# Patient Record
(demographics unavailable — no encounter records)

---

## 2025-01-21 NOTE — ASSESSMENT
[FreeTextEntry1] : Patient is a 40yo female who presents to the office for CPE.  Health Maintenance - Due for initial CRC screening given close family history of colon cancer, discussed importance of early screening, pt agreeable.  Referral placed to see GI. - Due for skin check, Dermatology referral provided. - Due for dental exam, recommended today. - Fasting labs drawn in office. - EKG performed in office SB, no acute ST/T changes, no arrhythmias.  Pt denies cardiac complaints. - Eat plenty of fruits and vegetables, especially deeply colored fruits/vegetables (such as leafy greens, peaches) that are more nutrient-dense.  Continue to work hard on diet and exercise, limiting/avoiding saturated fat, fatty foods, greasy foods, red meats, white flour-based carbohydrates (cookies, cakes, white bread, white rice), and added sugars.  Chose whole grain foods and products made with whole grains over refined grains and white flour-based carbohydrates.  Avoid beverages and food with added sugar.  Limit salt intake to improve blood pressure.  Limit alcohol intake. - Try and incorporate 30 minutes of aerobic exercise to your daily routine.  Call the office or go to the ED immediately if you develop new, worsening or concerning symptoms including high fever, severe headache/worst headache of your life, confusion, dizziness/lightheadedness, loss of consciousness, severe chest pain, difficulty breathing, shortness of breath, severe abdominal pain, excessive vomiting/diarrhea, inability to feel/move the extremities, or any other concerning symptoms.

## 2025-01-21 NOTE — HISTORY OF PRESENT ILLNESS
[FreeTextEntry1] : CPE. [de-identified] : Patient is a 40yo female with PMH anxiety who presents to the office for CPE.    Last CPE:  10/27/2021, Dr. HAMMAD Ann.  GYN Exam:  2024, Heidi Rob. Mammogram:  2025, reportedly normal; [facility] ([place]). Colonoscopy:  +FHx colon cancer, mother diagnosed at age 52,  age 54.  Pt has not had initial screening colonoscopy. Ophthalmology:  , wears corrective lenses for driving. Dermatology:  due, recommended today. Dentist:  due, recommended today. Flu:  declines. Tdap:  2016. COVID:  received. LMP:  2.5 weeks ago, menstruation is regular, not on birth control. STOP BANG negative.

## 2025-01-21 NOTE — HEALTH RISK ASSESSMENT
[Yes] : Yes [2 - 3 times a week (3 pts)] : 2 - 3  times a week (3 points) [1 or 2 (0 pts)] : 1 or 2 (0 points) [Never (0 pts)] : Never (0 points) [No] : In the past 12 months have you used drugs other than those required for medical reasons? No [No falls in past year] : Patient reported no falls in the past year [0] : 2) Feeling down, depressed, or hopeless: Not at all (0) [PHQ-2 Negative - No further assessment needed] : PHQ-2 Negative - No further assessment needed [Never] : Never [HIV test declined] : HIV test declined [Hepatitis C test declined] : Hepatitis C test declined [None] : None [With Family] : lives with family [] :  [# Of Children ___] : has [unfilled] children [Sexually Active] : sexually active [Feels Safe at Home] : Feels safe at home [Fully functional (bathing, dressing, toileting, transferring, walking, feeding)] : Fully functional (bathing, dressing, toileting, transferring, walking, feeding) [Fully functional (using the telephone, shopping, preparing meals, housekeeping, doing laundry, using] : Fully functional and needs no help or supervision to perform IADLs (using the telephone, shopping, preparing meals, housekeeping, doing laundry, using transportation, managing medications and managing finances) [With Patient/Caregiver] : , with patient/caregiver [Reviewed no changes] : Reviewed, no changes [I will adhere to the patient's wishes.] : I will adhere to the patient's wishes. [Patient reported mammogram was normal] : Patient reported mammogram was normal [Patient reported PAP Smear was normal] : Patient reported PAP Smear was normal [Audit-CScore] : 3 [de-identified] : exercises 3-5x/wk; bike, weight training [de-identified] : well balanced [[id number]] : 0 [EyeExamDate] : 2024 [Change in mental status noted] : No change in mental status noted [Language] : denies difficulty with language [High Risk Behavior] : no high risk behavior [MammogramDate] : 01/2025 [PapSmearDate] : 08/2024 [FreeTextEntry2] : dog walking, involved in son's school

## 2025-03-03 NOTE — ASSESSMENT
[FreeTextEntry1] : Ms. Yadav is a 42-year-old female here to discuss colon cancer screening.   #Colon cancer screening #Family hx of CRC  - Given family history of CRC in first degree relative will need colonoscopy every 5 years - We discussed that colonoscopy is the gold standard given our ability to diagnose and treat with this modality. Patient agreeable to proceed with colonoscopy. We discussed that the risks include, but are not limited to infection, bleeding, perforation, and missed lesions. Patient understands and agreeable to proceed. Suprep ordered.

## 2025-03-03 NOTE — HISTORY OF PRESENT ILLNESS
[FreeTextEntry1] : Ms. Yadav is a 42-year-old female here to discuss colon cancer screening.   - Last colonoscopy: none - Family history of CRC: mother dx at age 52 - Symptoms of altered bowel habits or bleeding: none - Past medical history: none - Use of blood thinners: none - Use of diabetic medications: none - Use of weight loss medications: none - Prior abdominal surgeries:  9 years ago

## 2025-04-24 NOTE — ASSESSMENT
[FreeTextEntry1] : #Acne - education & counseling  - gentle skin care reviewed w/ patient - start tretinoin 0.025% cream qhs. SEd including erythema and irritation   #  Multiple melanocytic nevi, benign - Monitor moles for changes - Recommend wearing hats and sun protective clothing or OTC sunscreen products (SPF 30+, broad band, EltaMD/La Roche Posay/Neutrogena) daily on your face and entire body (apply sunscreen atleast 30 minutes prior to going outside). Reapply sunscreen every 2 hours when outside.  # Solar lentigines - Discussed etiology and benign nature of condition - Sun protective measures reinforced. Recommended OTC sunscreen products, including SPF30+ with broadband UV protection as well as proper use.  #  Screening exam for skin cancer- TBSE performed today - Advised sun protection. Recommended OTC sunscreen products (EltaMD/Neutrogena/La Roche Posay), including SPF30+ with broadband UV protection as well as proper use. Discussed OTC sun protective clothing - Counseled patient to monitor for changes, including mole monitoring and self-skin exams.  RTC 1 year or sooner PRN

## 2025-04-24 NOTE — PHYSICAL EXAM
[Alert] : alert [Oriented x 3] : ~L oriented x 3 [Well Nourished] : well nourished [Full Body Skin Exam Performed] : performed [FreeTextEntry3] :  alert,  oriented x 3, well nourished, conjunctiva non-injected, no visual lymphadenopathy, no clubbing, no edema, no bromhidrosis and no chromhidrosis.   Full body skin exam was performed.   General: Alert and oriented, in NAD. All of the following were examined and were within normal limits, except as noted: Scalp: Face, including eyelids, nose, lips, ears, oropharynx: Neck: Chest/Back/Abdomen: Bilateral Arms/Hands: Bilateral Legs/Feet: Buttocks, Genitalia, Anus/perineum:   Hair, Nails, Oral Mucosa, Eyes: - benign nevi trunk & extremities  - lentigines  - hyperpigmented macules on face, mild comedonal acne

## 2025-04-24 NOTE — HISTORY OF PRESENT ILLNESS
[FreeTextEntry1] : TBSE  [de-identified] : 43yo F here for:   #TBSE- no new or concerning spots #acne- using OTC products (honest skin care products)  Skin Cancer Hx: denies Family Hx: denies